# Patient Record
(demographics unavailable — no encounter records)

---

## 2025-06-13 NOTE — DISCUSSION/SUMMARY
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   I had a discussion with the patient regarding today's visit, the prognosis of this diagnosis, and treatment recommendations and options. At this time, I recommended she be evaluated by a Rheumatologist. The appropriate referral was provided to her today.  I told her that I would like her to be evaluated by rheumatologist and if in the future, she would like, then she can see me back about reconstructive options for her hands.  However, at this point she is functioning fairly well, but I do believe that it is necessary for her to see a rheumatologist.   The patient has agreed to the above plan of management and has expressed full understanding. All questions were fully answered to the patient's satisfaction.   My cumulative time spent on this visit included: Preparation for the visit, review of the medical records, review of pertinent diagnostic studies, examination and counseling of the patient on the above diagnosis, treatment plan and prognosis, orders of diagnostic tests, medication and/or appropriate procedures and documentation in the medical records of today's visit.

## 2025-06-13 NOTE — PHYSICAL EXAM
[de-identified] : - Constitutional: This is a female in no obvious distress.  - Psych: Patient is alert and oriented to person, place and time.  Patient has a normal mood and affect. - Cardiovascular: Normal pulses throughout the upper extremities.  No significant varicosities are noted in the upper extremities. - Neuro: Strength and sensation are intact throughout the upper extremities.  Patient has normal coordination.  ---  Examination of both hands demonstrates findings consistent with what appears to be undiagnosed rheumatoid arthritis.  She has ulnar drift of the digits at the MCP joints with loss of extension both actively and passively.  Her MCP joints are flexed down approximately 20 degrees bilaterally.  She has swan-neck deformities of both little fingers more notable on the left side.  She can flex the digits into the palm bilaterally.  She is neurovascularly intact distally bilaterally.  [de-identified] :  PA, lateral, and oblique radiographs of the bilateral hands demonstrate on the right side there is widening of the scaphoid lunate interval, advanced STT joint arthritis and deformity of the distal ulna. 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   On the left side widening of the scaphoid lunate interval. arthritis of the radial lunate joint. Advanced STT joint arthritis. Advanced MCP joint arthritis. [place] arthritis erosion at the index finger MCP joint. [place] neck deformity of the little finger consistent with rheumatoid arthritis.

## 2025-06-13 NOTE — HISTORY OF PRESENT ILLNESS
[Right] : right hand dominant [FreeTextEntry1] : She comes in today for evaluation of bilateral hand pain over the past over the past 6 months. She rates her pain as a 2/10 and denies any numbness/tingling or radiating pain.   She has a medical history of heart disease and is on aspirin.  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

## 2025-06-13 NOTE — END OF VISIT
[FreeTextEntry3] : This note was written by Noé Epstein on 06/13/2025 acting solely as a scribe for Dr. Alber Alexander.   All medical record entries made by the Scribe were at my, Dr. Alber Alexander, direction and personally dictated by me on 06/13/2025. I have personally reviewed the chart and agree that the record accurately reflects my personal performance of the history, physical exam, assessment and plan.

## 2025-06-13 NOTE — ADDENDUM
[FreeTextEntry1] :  I, Noé Epstein, acted solely as a scribe for Dr. Alexander on this date on 06/13/2025.